# Patient Record
(demographics unavailable — no encounter records)

---

## 2025-05-15 NOTE — HISTORY OF PRESENT ILLNESS
[FreeTextEntry1] : 42-year-old gentleman who fell at home in 2011 while he was still living in [place].  Patient has herniated disks for which a laminectomy was performed and since then patient has back pain radiating down his right leg along with vertigo and neck pain with radiation down his arms.  Patient has seen a previous neurologist with extensive workup.  Patient saw ENT and had MRI of the brain cervical spine and lumbar spine.  Patient was advised to go to vestibular therapy and physical therapy.  Patient recently went to [facility] ER on May 12, 2025 and was referred to pain management.  Patient wanted to get a second opinion whether he needs to be on gabapentin.  At this time the gabapentin is helping with his back pain and he was advised that he should continue.

## 2025-05-15 NOTE — DISCUSSION/SUMMARY
[FreeTextEntry1] : 42-year-old gentleman who is here for initial consultation of whether he should continue the gabapentin for his back pain.  Patient was advised that gabapentin is a relatively safe medication and since it is helping with his pain management he should continue and see pain management for other interventions that might help him.  Patient was advised that he should continue vestibular and physical therapy for the time being and follow-up with his neurologist.  Patient will follow-up with me as needed.  I spent the time noted on the day of this patient encounter preparing for, review of medical records,review of pertinent diagnostic studies, providing and documenting the above E/M service and counseling and educate patient on differential, workup, disease course, and treatment/management. Education was provided to the patient during this encounter. All questions and concerns were answered and addressed in detail. The patient verbalized understanding and agreed to plan. Patient was advised to continue to monitor for neurologic symptoms and to notify my office or go to the nearest emergency room if there are any changes. Any orders/referrals and communications were provided as well. Side effects of the above medications were discussed in detail including but not limited to applicable black box warning and teratogenicity as appropriate. Patient was advised to bring previous records to my office. A copy of the consult note will be sent to the referring physician.

## 2025-05-15 NOTE — PHYSICAL EXAM
[FreeTextEntry1] : Constitutional: alert. Psychiatric: oriented to person, place, and time. Neurologic:  Orientation: oriented to person, oriented to place and oriented to time.  Memory: short term memory intact.  Language: fluency intact.  Fund of knowledge: displays adequate knowledge of current events.  Cranial Nerves: visual acuity intact bilaterally, visual fields full to confrontation, pupils equal round and reactive to light, extraocular motion intact, facial sensation intact symmetrically, face symmetrical, hearing was intact bilaterally, head turning and shoulder shrug symmetric and there was no tongue deviation with protrusion.  Motor: muscle tone was normal in all four extremities and muscle strength was normal in all four extremities.  Sensory exam: light touch was intact.  Coordination:. Finger to nose dysmetria was not present.  normal gait with cane. Deep tendon reflexes: Biceps right 1+. Biceps left 1+. Patella right 1+. Patella left 1+.

## 2025-05-15 NOTE — REASON FOR VISIT
[Spouse] : spouse [Language Line ] : provided by [employer]   [Consultation] : a consultation visit [FreeTextEntry1] : Second opinion about gabapentin [Interpreters_IDNumber] : [id number]